# Patient Record
Sex: MALE | Race: WHITE | NOT HISPANIC OR LATINO | Employment: UNEMPLOYED | URBAN - METROPOLITAN AREA
[De-identification: names, ages, dates, MRNs, and addresses within clinical notes are randomized per-mention and may not be internally consistent; named-entity substitution may affect disease eponyms.]

---

## 2017-08-10 ENCOUNTER — HOSPITAL ENCOUNTER (EMERGENCY)
Facility: HOSPITAL | Age: 27
Discharge: HOME | End: 2017-08-10
Attending: EMERGENCY MEDICINE | Admitting: EMERGENCY MEDICINE

## 2017-08-10 VITALS
TEMPERATURE: 97 F | DIASTOLIC BLOOD PRESSURE: 89 MMHG | OXYGEN SATURATION: 97 % | WEIGHT: 175 LBS | HEART RATE: 64 BPM | SYSTOLIC BLOOD PRESSURE: 121 MMHG | RESPIRATION RATE: 16 BRPM | HEIGHT: 65 IN | BODY MASS INDEX: 29.16 KG/M2

## 2017-08-10 DIAGNOSIS — K92.2 LOWER GI BLEEDING: Primary | ICD-10-CM

## 2017-08-10 LAB
ALBUMIN SERPL-MCNC: 4 G/DL (ref 3.5–5.2)
ALBUMIN/GLOB SERPL: 1.2 G/DL
ALP SERPL-CCNC: 79 U/L (ref 39–117)
ALT SERPL W P-5'-P-CCNC: 14 U/L (ref 1–41)
ANION GAP SERPL CALCULATED.3IONS-SCNC: 10.6 MMOL/L
AST SERPL-CCNC: 16 U/L (ref 1–40)
BASOPHILS # BLD AUTO: 0.02 10*3/MM3 (ref 0–0.2)
BASOPHILS NFR BLD AUTO: 0.3 % (ref 0–1.5)
BILIRUB SERPL-MCNC: 0.6 MG/DL (ref 0.1–1.2)
BUN BLD-MCNC: 15 MG/DL (ref 6–20)
BUN/CREAT SERPL: 16.3 (ref 7–25)
CALCIUM SPEC-SCNC: 9.4 MG/DL (ref 8.6–10.5)
CHLORIDE SERPL-SCNC: 104 MMOL/L (ref 98–107)
CO2 SERPL-SCNC: 26.4 MMOL/L (ref 22–29)
CREAT BLD-MCNC: 0.92 MG/DL (ref 0.76–1.27)
DEPRECATED RDW RBC AUTO: 38.9 FL (ref 37–54)
EOSINOPHIL # BLD AUTO: 0 10*3/MM3 (ref 0–0.7)
EOSINOPHIL NFR BLD AUTO: 0 % (ref 0.3–6.2)
ERYTHROCYTE [DISTWIDTH] IN BLOOD BY AUTOMATED COUNT: 12.3 % (ref 11.5–14.5)
GFR SERPL CREATININE-BSD FRML MDRD: 97 ML/MIN/1.73
GLOBULIN UR ELPH-MCNC: 3.3 GM/DL
GLUCOSE BLD-MCNC: 106 MG/DL (ref 65–99)
HCT VFR BLD AUTO: 43.5 % (ref 40.4–52.2)
HGB BLD-MCNC: 14.7 G/DL (ref 13.7–17.6)
IMM GRANULOCYTES # BLD: 0.02 10*3/MM3 (ref 0–0.03)
IMM GRANULOCYTES NFR BLD: 0.3 % (ref 0–0.5)
INR PPP: 1.08 (ref 0.9–1.1)
LYMPHOCYTES # BLD AUTO: 0.96 10*3/MM3 (ref 0.9–4.8)
LYMPHOCYTES NFR BLD AUTO: 14.8 % (ref 19.6–45.3)
MCH RBC QN AUTO: 29.2 PG (ref 27–32.7)
MCHC RBC AUTO-ENTMCNC: 33.8 G/DL (ref 32.6–36.4)
MCV RBC AUTO: 86.3 FL (ref 79.8–96.2)
MONOCYTES # BLD AUTO: 0.43 10*3/MM3 (ref 0.2–1.2)
MONOCYTES NFR BLD AUTO: 6.6 % (ref 5–12)
NEUTROPHILS # BLD AUTO: 5.06 10*3/MM3 (ref 1.9–8.1)
NEUTROPHILS NFR BLD AUTO: 78 % (ref 42.7–76)
PLATELET # BLD AUTO: 166 10*3/MM3 (ref 140–500)
PMV BLD AUTO: 9.9 FL (ref 6–12)
POTASSIUM BLD-SCNC: 4.5 MMOL/L (ref 3.5–5.2)
PROT SERPL-MCNC: 7.3 G/DL (ref 6–8.5)
PROTHROMBIN TIME: 13.6 SECONDS (ref 11.7–14.2)
RBC # BLD AUTO: 5.04 10*6/MM3 (ref 4.6–6)
SODIUM BLD-SCNC: 141 MMOL/L (ref 136–145)
WBC NRBC COR # BLD: 6.49 10*3/MM3 (ref 4.5–10.7)

## 2017-08-10 PROCEDURE — 85610 PROTHROMBIN TIME: CPT | Performed by: EMERGENCY MEDICINE

## 2017-08-10 PROCEDURE — 99283 EMERGENCY DEPT VISIT LOW MDM: CPT

## 2017-08-10 PROCEDURE — 85025 COMPLETE CBC W/AUTO DIFF WBC: CPT | Performed by: EMERGENCY MEDICINE

## 2017-08-10 PROCEDURE — 80053 COMPREHEN METABOLIC PANEL: CPT | Performed by: EMERGENCY MEDICINE

## 2017-08-10 RX ORDER — SODIUM CHLORIDE 0.9 % (FLUSH) 0.9 %
10 SYRINGE (ML) INJECTION AS NEEDED
Status: DISCONTINUED | OUTPATIENT
Start: 2017-08-10 | End: 2017-08-10 | Stop reason: HOSPADM

## 2017-08-10 NOTE — ED PROVIDER NOTES
EMERGENCY DEPARTMENT ENCOUNTER    CHIEF COMPLAINT  Chief Complaint: rectal bleeding  History given by: patient, family  History limited by: N/A  Room Number: 40/40  PMD: Joe Liu MD      HPI:  Pt states that this morning, he had one episode of rectal bleeding (small to moderate amount of cherelle bright red bloody swirls in toilet and on paper after wiping, no stool). He has also had intermittent RUQ abd pain (described as dull cramping) for several months that did not acutely change today. He denies N/V/D, pain and difficulty with urination, fevers, chills, chest pain, trouble breathing, cough, headache, acute change in chronic back pain, eye pain, weakness, and dizziness. He reports that he has not had similar sx in the past. Pt has no other complaints at this time.      Duration/Onset/Timing: occurred this morning  Location: rectum  Radiation: none  Quality: none  Intensity/Severity: moderate  Associated Symptoms: RUQ abd pain (for several months, no acute change)  Aggravating or Alleviating Factors: none  Previous Episodes: none      PAST MEDICAL HISTORY  Active Ambulatory Problems     Diagnosis Date Noted   • No Active Ambulatory Problems     Resolved Ambulatory Problems     Diagnosis Date Noted   • No Resolved Ambulatory Problems     No Additional Past Medical History       PAST SURGICAL HISTORY  Past Surgical History:   Procedure Laterality Date   • HERNIA REPAIR         FAMILY HISTORY  History reviewed. No pertinent family history.    SOCIAL HISTORY  Social History     Social History   • Marital status: Single     Spouse name: N/A   • Number of children: N/A   • Years of education: N/A     Occupational History   • Not on file.     Social History Main Topics   • Smoking status: Former Smoker   • Smokeless tobacco: Not on file   • Alcohol use No   • Drug use: Not on file      Comment: history of drug abuse (2 years ago)   • Sexual activity: Defer     Other Topics Concern   • Not on file     Social  History Narrative   • No narrative on file       ALLERGIES  Review of patient's allergies indicates no known allergies.    REVIEW OF SYSTEMS  Review of Systems   Constitutional: Negative.  Negative for chills and fever.   HENT: Negative.  Negative for sore throat.    Eyes: Negative.    Respiratory: Negative.  Negative for cough and shortness of breath.    Cardiovascular: Negative.  Negative for chest pain.   Gastrointestinal: Negative.  Negative for diarrhea, nausea and vomiting. Abdominal pain: RUQ abd pain for several months (no acute change)        Rectal bleeding   Genitourinary: Negative.  Negative for difficulty urinating and dysuria.   Musculoskeletal: Negative.  Back pain: chronic, no acute change.   Skin: Negative.  Negative for rash.   Neurological: Negative.  Negative for dizziness, weakness and headaches.       PHYSICAL EXAM  ED Triage Vitals   Temp Heart Rate Resp BP SpO2   08/10/17 0924 08/10/17 0924 08/10/17 0924 08/10/17 0936 08/10/17 0924   97 °F (36.1 °C) 77 17 130/77 98 % WNL      Temp src Heart Rate Source Patient Position BP Location FiO2 (%)   08/10/17 0924 08/10/17 0924 08/10/17 0936 08/10/17 0936 --   Tympanic Monitor Lying Left arm        Physical Exam   Constitutional: He is oriented to person, place, and time and well-developed, well-nourished, and in no distress. No distress.   HENT:   Head: Normocephalic and atraumatic.   Mouth/Throat: Mucous membranes are normal.   Eyes: EOM are normal. Pupils are equal, round, and reactive to light. No scleral icterus.   Neck: Normal range of motion. Neck supple.   Cardiovascular: Normal rate, regular rhythm and normal heart sounds.    Pulmonary/Chest: Effort normal and breath sounds normal. No respiratory distress. He has no decreased breath sounds. He has no wheezes. He has no rhonchi. He has no rales.   Abdominal: Soft. There is no tenderness. There is no rebound and no guarding.   Musculoskeletal: Normal range of motion.   Neurological: He is alert  and oriented to person, place, and time. He has normal motor skills and normal sensation.   Skin: Skin is warm and dry.   Psychiatric: Mood and affect normal.   Nursing note and vitals reviewed.      LAB RESULTS  Lab Results (last 24 hours)     Procedure Component Value Units Date/Time    Comprehensive Metabolic Panel [[id number]]  (Abnormal) Collected:  08/10/17 1020    Specimen:  Blood Updated:  08/10/17 1112     Glucose 106 (H) mg/dL      BUN 15 mg/dL      Creatinine 0.92 mg/dL      Sodium 141 mmol/L      Potassium 4.5 mmol/L      Chloride 104 mmol/L      CO2 26.4 mmol/L      Calcium 9.4 mg/dL      Total Protein 7.3 g/dL      Albumin 4.00 g/dL      ALT (SGPT) 14 U/L      AST (SGOT) 16 U/L      Alkaline Phosphatase 79 U/L      Total Bilirubin 0.6 mg/dL      eGFR Non African Amer 97 mL/min/1.73      Globulin 3.3 gm/dL      A/G Ratio 1.2 g/dL      BUN/Creatinine Ratio 16.3     Anion Gap 10.6 mmol/L     CBC & Differential [[id number]] Collected:  08/10/17 1020    Specimen:  Blood Updated:  08/10/17 1031    Narrative:       The following orders were created for panel order CBC & Differential.  Procedure                               Abnormality         Status                     ---------                               -----------         ------                     CBC Auto Differential[[id number]]        Abnormal            Final result                 Please view results for these tests on the individual orders.    Protime-INR [[id number]]  (Normal) Collected:  08/10/17 1020    Specimen:  Blood Updated:  08/10/17 1040     Protime 13.6 Seconds      INR 1.08    CBC Auto Differential [[id number]]  (Abnormal) Collected:  08/10/17 1020    Specimen:  Blood Updated:  08/10/17 1031     WBC 6.49 10*3/mm3      RBC 5.04 10*6/mm3      Hemoglobin 14.7 g/dL      Hematocrit 43.5 %      MCV 86.3 fL      MCH 29.2 pg      MCHC 33.8 g/dL      RDW 12.3 %      RDW-SD 38.9 fl      MPV 9.9 fL      Platelets 166 10*3/mm3      Neutrophil % 78.0  (H) %      Lymphocyte % 14.8 (L) %      Monocyte % 6.6 %      Eosinophil % 0.0 (L) %      Basophil % 0.3 %      Immature Grans % 0.3 %      Neutrophils, Absolute 5.06 10*3/mm3      Lymphocytes, Absolute 0.96 10*3/mm3      Monocytes, Absolute 0.43 10*3/mm3      Eosinophils, Absolute 0.00 10*3/mm3      Basophils, Absolute 0.02 10*3/mm3      Immature Grans, Absolute 0.02 10*3/mm3           I ordered the above labs and reviewed the results        PROCEDURES  Procedures      PROGRESS AND CONSULTS  ED Course   Comment By Time   9:46 AM  28 yo with some BRBPR this am after having BM.  Pt with some RUQ for several months with neg labs and US per pt.  Exam - No real TTP of abd.  Plan - labs and observe over 1-2 hrs to look for recurrent bleeding.  Likely int hemorrhoid.  D/w pt and fiance at bedside. Lucio Schafer MD 08/10 0982     9:45 AM- Ordered blood work and PT with INR for further evaluation. Informed pt and family of plan to observe pt in ED for any recurrent rectal bleeding.      11:31 AM- Rechecked pt. He is resting comfortably and is in no acute distress. Pt has had no rectal bleeding in ED. Discussed with pt about all pertinent results (including hemoglobin of 14.7), dx, and plan for discharge. Informed pt that sx could possibly be due to internal hemorrhoid. Instructed to f/u with PMD and referred colorectal surgeon for recheck and for further testing/treatment as needed. RTER warnings given. Pt understands and agrees with plan. Addressed all questions.        MEDICAL DECISION MAKING  Results were reviewed/discussed with the patient.     MDM  Number of Diagnoses or Management Options  Lower GI bleeding:      Amount and/or Complexity of Data Reviewed  Clinical lab tests: ordered and reviewed (Hgb= 14.7, BUN= 15, creatinine= 0.92)    Patient Progress  Patient progress: stable         DIAGNOSIS  Final diagnoses:   Lower GI bleeding       DISPOSITION  DISCHARGE    Patient discharged in stable condition.    Reviewed  implications of results, diagnosis, responsibility to follow up, warning signs and symptoms of possible worsening, potential complications and reasons to return to ER.    Patient voiced understanding of above instructions.    Discussed plan for discharge, as there is no emergent indication for admission.  Pt is agreeable and understands need for follow up and repeat testing.  Pt is aware that discharge does not mean that nothing is wrong but it indicates no emergency is present and they must continue care with follow-up as given below or physician of their choice.     FOLLOW-UP  Joe Liu MD  [address] [facility] [id number] [phone]          Stu Alvarez MD  [address] [facility] [address]  [facility] [id number] [phone]      Call for Appointment      Latest Documented Vital Signs:  As of 11:33 AM  BP- 130/77 HR- 77 Temp- 97 °F (36.1 °C) (Tympanic) O2 sat- 98%    --  Documentation assistance provided by aga Fairbanks for Dr Schafer.  Information recorded by the scribe was done at my direction and has been verified and validated by me.       oRyce Fairbanks  08/10/17 1138       Lucio Schafer MD  08/10/17 1133

## 2017-08-10 NOTE — ED NOTES
Pt reports lower abdominal pain x several months, and he has a referral to a GI MD. However, pt reports he had a large amount of bright red blood in the toilet this morning when he had a bowel movement, and thought he should come in to get it checked out.          Cari Anthony RN  08/10/17 8061

## 2017-10-16 ENCOUNTER — HOSPITAL ENCOUNTER (EMERGENCY)
Facility: HOSPITAL | Age: 27
Discharge: HOME | End: 2017-10-16
Attending: EMERGENCY MEDICINE | Admitting: EMERGENCY MEDICINE

## 2017-10-16 ENCOUNTER — APPOINTMENT (OUTPATIENT)
Dept: GENERAL RADIOLOGY | Facility: HOSPITAL | Age: 27
End: 2017-10-16

## 2017-10-16 VITALS
SYSTOLIC BLOOD PRESSURE: 125 MMHG | HEIGHT: 65 IN | OXYGEN SATURATION: 97 % | WEIGHT: 175 LBS | RESPIRATION RATE: 16 BRPM | TEMPERATURE: 97.8 F | BODY MASS INDEX: 29.16 KG/M2 | DIASTOLIC BLOOD PRESSURE: 69 MMHG | HEART RATE: 61 BPM

## 2017-10-16 DIAGNOSIS — R07.89 ATYPICAL CHEST PAIN: Primary | ICD-10-CM

## 2017-10-16 PROCEDURE — 93005 ELECTROCARDIOGRAM TRACING: CPT | Performed by: PHYSICIAN ASSISTANT

## 2017-10-16 PROCEDURE — 99284 EMERGENCY DEPT VISIT MOD MDM: CPT

## 2017-10-16 PROCEDURE — 93010 ELECTROCARDIOGRAM REPORT: CPT | Performed by: INTERNAL MEDICINE

## 2017-10-16 PROCEDURE — 71020 HC CHEST PA AND LATERAL: CPT

## 2017-10-16 NOTE — ED NOTES
Pt states he has been feeling light headed on and off, but not continuously      Sonal Lang RN  10/16/17 1917

## 2017-10-16 NOTE — ED TRIAGE NOTES
"Pt reports feeling faint followed by chest pain and panic attack x4 days. Pt states \"I don't know if it's my anxiety but my neck feels pressure so I wanted to come in.\"   "

## 2017-10-17 NOTE — ED PROVIDER NOTES
" EMERGENCY DEPARTMENT ENCOUNTER    CHIEF COMPLAINT  Chief Complaint: CP  History given by: pt   History limited by: none   Room Number: 25/25  PMD: Joe Liu MD      HPI:  Pt is a 27 y.o. male who presents complaining of intermittent sharp and dull L CP with radiation to his L arm for the past 3 days, and episodes lasting several seconds. Pt denies that anything increases or decreases his pain. Pt also c/o anxiety with his pain, SOA, and intermittent lightheadedness that he describes as \"wooziness\". Pt denies fever, or N/V/D.    Duration/Onset/Timing: 3 days  Location: L chest  Radiation: L arm  Quality: sharp and dull  Intensity/Severity: mild  Associated Symptoms: anxiety, SOA, intermittent lightheadedness  Aggravating or Alleviating Factors: none   Previous Episodes: none stated       PAST MEDICAL HISTORY  Active Ambulatory Problems     Diagnosis Date Noted   • No Active Ambulatory Problems     Resolved Ambulatory Problems     Diagnosis Date Noted   • No Resolved Ambulatory Problems     No Additional Past Medical History       PAST SURGICAL HISTORY  Past Surgical History:   Procedure Laterality Date   • HERNIA REPAIR         FAMILY HISTORY  History reviewed. No pertinent family history.    SOCIAL HISTORY  Social History     Social History   • Marital status: Single     Spouse name: N/A   • Number of children: N/A   • Years of education: N/A     Occupational History   • Not on file.     Social History Main Topics   • Smoking status: Former Smoker   • Smokeless tobacco: Current User     Types: Snuff   • Alcohol use No   • Drug use: No      Comment: history of drug abuse (2 years ago)   • Sexual activity: Defer     Other Topics Concern   • Not on file     Social History Narrative   • No narrative on file       ALLERGIES  Review of patient's allergies indicates no known allergies.    REVIEW OF SYSTEMS  Review of Systems   Constitutional: Negative for fever.   Respiratory: Positive for shortness of breath.  "   Cardiovascular: Positive for chest pain (intermittent, 3 days).   Neurological: Positive for light-headedness (intermittent).   Psychiatric/Behavioral: The patient is nervous/anxious.        PHYSICAL EXAM  ED Triage Vitals   Temp Heart Rate Resp BP SpO2   10/16/17 1718 10/16/17 1718 10/16/17 1718 10/16/17 1723 10/16/17 1718   97.9 °F (36.6 °C) 73 12 143/91 99 %      Temp src Heart Rate Source Patient Position BP Location FiO2 (%)   10/16/17 1718 10/16/17 1718 10/16/17 1916 10/16/17 1916 --   Tympanic Monitor Lying Right arm        Physical Exam   Constitutional: No distress.   HENT:   Head: Normocephalic and atraumatic.   Cardiovascular: Regular rhythm.    Pulmonary/Chest: No respiratory distress.   Abdominal: There is no tenderness.   Musculoskeletal: He exhibits no tenderness.   Skin: No rash noted.   Nursing note and vitals reviewed.      RADIOLOGY  XR Chest 2 View   Final Result   Negative chest       This report was finalized on 10/16/2017 7:12 PM by Dr. Adelso Smith MD.               I ordered the above noted radiological studies. Interpreted by radiologist. Reviewed by me in PACS.       PROCEDURES  Procedures  EKG           EKG time: 1806  Rhythm/Rate: sinus rhythm 62  P waves and IN: normal   QRS, axis: normal    ST and T waves: unremarkable    Interpreted Contemporaneously by me, independently viewed  No prior EKG.       PROGRESS AND CONSULTS  ED Course   2031  Discussed the pt's symptoms and EKG, and impression that the pt's pain is not cardiac in origin. Plan to assess the pt's old labs and d/c the pt if there are no abnormalities. Pt understands and agrees with the plan, and all questions were answered.       MEDICAL DECISION MAKING  Results were reviewed/discussed with the patient and they were also made aware of online access. Pt also made aware that some labs, such as cultures, will not be resulted during ER visit and follow up with PMD is necessary.     MDM  Number of Diagnoses or Management  Options     Amount and/or Complexity of Data Reviewed  Tests in the radiology section of CPT®: ordered and reviewed (CXR: negative)  Tests in the medicine section of CPT®: ordered and reviewed (See procedures section for EKG. )  Decide to obtain previous medical records or to obtain history from someone other than the patient: yes (Pt's records in EPIC)  Review and summarize past medical records: yes (Pt was here for lower GI bleeding month ago, with normal labs.)    Patient Progress  Patient progress: stable         DIAGNOSIS  Final diagnoses:   Atypical chest pain       DISPOSITION  DISCHARGE    Patient discharged in stable condition.    Reviewed implications of results, diagnosis, meds, responsibility to follow up, warning signs and symptoms of possible worsening, potential complications and reasons to return to ER.    Patient/Family voiced understanding of above instructions.    Discussed plan for discharge, as there is no emergent indication for admission.  Pt/family is agreeable and understands need for follow up and repeat testing.  Pt is aware that discharge does not mean that nothing is wrong but it indicates no emergency is present that requires admission and they must continue care with follow-up as given below or physician of their choice.     FOLLOW-UP  Joe Liu MD  [address] [id number]  [phone]    In 1 week  If Not Better         Medication List      Notice     No changes were made to your prescriptions during this visit.            Latest Documented Vital Signs:  As of 8:37 PM  BP- 148/92 HR- 66 Temp- 97.9 °F (36.6 °C) (Tympanic) O2 sat- 98%    --  Documentation assistance provided by aga Jensen for Dr. Schafer.  Information recorded by the scrlacy was done at my direction and has been verified and validated by me.       Julián Jensen  10/16/17 2038       Lucio Schafer MD  10/16/17 2040